# Patient Record
Sex: MALE | URBAN - METROPOLITAN AREA
[De-identification: names, ages, dates, MRNs, and addresses within clinical notes are randomized per-mention and may not be internally consistent; named-entity substitution may affect disease eponyms.]

---

## 2017-12-24 ENCOUNTER — TRANSFERRED RECORDS (OUTPATIENT)
Dept: HEALTH INFORMATION MANAGEMENT | Facility: CLINIC | Age: 72
End: 2017-12-24

## 2019-04-05 ENCOUNTER — TRANSFERRED RECORDS (OUTPATIENT)
Dept: HEALTH INFORMATION MANAGEMENT | Facility: CLINIC | Age: 74
End: 2019-04-05

## 2019-12-27 NOTE — TELEPHONE ENCOUNTER
RECORDS RECEIVED FROM:   DATE RECEIVED:4.8.2020   NOTES STATUS DETAILS   OFFICE NOTE from referring provider    N/A    OFFICE NOTE from other cardiologist    In process    DISCHARGE SUMMARY from hospital    N/A    DISCHARGE REPORT from the ER   N/A    OPERATIVE REPORT    N/A    MEDICATION LIST   Care Everywhere    LABS     BMP   N/A    CBC   Care Everywhere 4.6.19   CMP   N/A    Lipids   Care Everywhere 4.1.14   TSH   N/A    DIAGNOSTIC PROCEDURES     EKG   In process 4.6.19   Monitor Reports   N/A    IMAGING (DISC & REPORT)      Echo   N/A    Stress Tests   In process 12.24.17   Cath   N/A    MRI/MRA   N/A    CT/CTA   N/A      Action MJ 12.27.19 9:58 AM   Action Taken Requested med records and imaging from [facility].  Requested EKG strips and report 4.6.19, stress test 12.24.17 strips from [facility]  1-10: Sent ekg and stress test strips from  to scanning

## 2020-04-08 ENCOUNTER — PRE VISIT (OUTPATIENT)
Dept: CARDIOLOGY | Facility: CLINIC | Age: 75
End: 2020-04-08

## 2020-05-14 ENCOUNTER — DOCUMENTATION ONLY (OUTPATIENT)
Dept: CARE COORDINATION | Facility: CLINIC | Age: 75
End: 2020-05-14